# Patient Record
Sex: FEMALE | Race: OTHER | HISPANIC OR LATINO
[De-identification: names, ages, dates, MRNs, and addresses within clinical notes are randomized per-mention and may not be internally consistent; named-entity substitution may affect disease eponyms.]

---

## 2017-10-17 ENCOUNTER — APPOINTMENT (OUTPATIENT)
Dept: OBGYN | Facility: CLINIC | Age: 77
End: 2017-10-17
Payer: MEDICARE

## 2017-10-17 VITALS
SYSTOLIC BLOOD PRESSURE: 150 MMHG | DIASTOLIC BLOOD PRESSURE: 84 MMHG | WEIGHT: 180 LBS | HEIGHT: 67 IN | BODY MASS INDEX: 28.25 KG/M2

## 2017-10-17 DIAGNOSIS — Z00.00 ENCOUNTER FOR GENERAL ADULT MEDICAL EXAMINATION W/OUT ABNORMAL FINDINGS: ICD-10-CM

## 2017-10-17 PROCEDURE — G0101: CPT

## 2017-10-17 PROCEDURE — 82270 OCCULT BLOOD FECES: CPT

## 2017-10-23 LAB — CYTOLOGY CVX/VAG DOC THIN PREP: NORMAL

## 2017-11-05 ENCOUNTER — FORM ENCOUNTER (OUTPATIENT)
Age: 77
End: 2017-11-05

## 2017-11-06 ENCOUNTER — OUTPATIENT (OUTPATIENT)
Dept: OUTPATIENT SERVICES | Facility: HOSPITAL | Age: 77
LOS: 1 days | End: 2017-11-06
Payer: MEDICARE

## 2017-11-06 ENCOUNTER — APPOINTMENT (OUTPATIENT)
Dept: RADIOLOGY | Facility: CLINIC | Age: 77
End: 2017-11-06
Payer: MEDICARE

## 2017-11-06 DIAGNOSIS — Z00.8 ENCOUNTER FOR OTHER GENERAL EXAMINATION: ICD-10-CM

## 2017-11-06 DIAGNOSIS — Z90.710 ACQUIRED ABSENCE OF BOTH CERVIX AND UTERUS: Chronic | ICD-10-CM

## 2017-11-06 DIAGNOSIS — Z86.79 PERSONAL HISTORY OF OTHER DISEASES OF THE CIRCULATORY SYSTEM: Chronic | ICD-10-CM

## 2017-11-06 PROCEDURE — 77080 DXA BONE DENSITY AXIAL: CPT

## 2017-11-06 PROCEDURE — 77080 DXA BONE DENSITY AXIAL: CPT | Mod: 26

## 2017-12-04 ENCOUNTER — APPOINTMENT (OUTPATIENT)
Dept: OBGYN | Facility: CLINIC | Age: 77
End: 2017-12-04

## 2017-12-13 ENCOUNTER — APPOINTMENT (OUTPATIENT)
Dept: OBGYN | Facility: CLINIC | Age: 77
End: 2017-12-13
Payer: MEDICARE

## 2017-12-13 VITALS
WEIGHT: 180 LBS | SYSTOLIC BLOOD PRESSURE: 134 MMHG | HEIGHT: 67 IN | BODY MASS INDEX: 28.25 KG/M2 | DIASTOLIC BLOOD PRESSURE: 82 MMHG

## 2017-12-13 PROCEDURE — 99214 OFFICE O/P EST MOD 30 MIN: CPT

## 2017-12-13 RX ORDER — RISEDRONATE SODIUM 35 MG/1
35 TABLET, FILM COATED ORAL
Qty: 12 | Refills: 3 | Status: ACTIVE | COMMUNITY
Start: 2017-12-13 | End: 1900-01-01

## 2018-06-04 ENCOUNTER — APPOINTMENT (OUTPATIENT)
Dept: OBGYN | Facility: CLINIC | Age: 78
End: 2018-06-04
Payer: MEDICARE

## 2018-06-04 VITALS
DIASTOLIC BLOOD PRESSURE: 84 MMHG | HEART RATE: 97 BPM | BODY MASS INDEX: 27.78 KG/M2 | SYSTOLIC BLOOD PRESSURE: 140 MMHG | HEIGHT: 67 IN | WEIGHT: 177 LBS

## 2018-06-04 DIAGNOSIS — N76.4 ABSCESS OF VULVA: ICD-10-CM

## 2018-06-04 PROCEDURE — 99213 OFFICE O/P EST LOW 20 MIN: CPT

## 2018-06-04 RX ORDER — FLECAINIDE ACETATE 100 MG/1
100 TABLET ORAL
Qty: 180 | Refills: 0 | Status: ACTIVE | COMMUNITY
Start: 2018-05-21

## 2018-06-04 RX ORDER — AMOXICILLIN 500 MG/1
500 CAPSULE ORAL
Qty: 8 | Refills: 0 | Status: COMPLETED | COMMUNITY
Start: 2018-02-12

## 2018-06-04 RX ORDER — METOPROLOL SUCCINATE 50 MG/1
50 TABLET, EXTENDED RELEASE ORAL
Qty: 90 | Refills: 0 | Status: ACTIVE | COMMUNITY
Start: 2017-12-04

## 2018-06-04 RX ORDER — WARFARIN 2 MG/1
2 TABLET ORAL
Qty: 150 | Refills: 0 | Status: ACTIVE | COMMUNITY
Start: 2018-05-17

## 2018-06-04 RX ORDER — CEPHALEXIN 500 MG/1
500 CAPSULE ORAL
Qty: 10 | Refills: 0 | Status: ACTIVE | COMMUNITY
Start: 2018-06-04 | End: 1900-01-01

## 2019-01-28 ENCOUNTER — OUTPATIENT (OUTPATIENT)
Dept: OUTPATIENT SERVICES | Facility: HOSPITAL | Age: 79
LOS: 1 days | End: 2019-01-28
Payer: MEDICARE

## 2019-01-28 DIAGNOSIS — I69.354 HEMIPLEGIA AND HEMIPARESIS FOLLOWING CEREBRAL INFARCTION AFFECTING LEFT NON-DOMINANT SIDE: ICD-10-CM

## 2019-01-28 DIAGNOSIS — R27.9 UNSPECIFIED LACK OF COORDINATION: ICD-10-CM

## 2019-01-28 DIAGNOSIS — Z51.89 ENCOUNTER FOR OTHER SPECIFIED AFTERCARE: ICD-10-CM

## 2019-01-28 DIAGNOSIS — Z90.710 ACQUIRED ABSENCE OF BOTH CERVIX AND UTERUS: Chronic | ICD-10-CM

## 2019-01-28 DIAGNOSIS — Z86.79 PERSONAL HISTORY OF OTHER DISEASES OF THE CIRCULATORY SYSTEM: Chronic | ICD-10-CM

## 2019-05-30 PROCEDURE — 97112 NEUROMUSCULAR REEDUCATION: CPT | Mod: KX

## 2019-05-30 PROCEDURE — 97110 THERAPEUTIC EXERCISES: CPT | Mod: KX

## 2019-05-30 PROCEDURE — 97163 PT EVAL HIGH COMPLEX 45 MIN: CPT

## 2019-07-25 ENCOUNTER — APPOINTMENT (OUTPATIENT)
Dept: DERMATOLOGY | Facility: CLINIC | Age: 79
End: 2019-07-25
Payer: MEDICARE

## 2019-07-25 PROCEDURE — 99202 OFFICE O/P NEW SF 15 MIN: CPT

## 2020-09-16 DIAGNOSIS — Z12.39 ENCOUNTER FOR OTHER SCREENING FOR MALIGNANT NEOPLASM OF BREAST: ICD-10-CM

## 2020-09-16 DIAGNOSIS — Z78.0 ASYMPTOMATIC MENOPAUSAL STATE: ICD-10-CM

## 2020-09-17 ENCOUNTER — APPOINTMENT (OUTPATIENT)
Dept: OBGYN | Facility: CLINIC | Age: 80
End: 2020-09-17
Payer: MEDICARE

## 2020-09-17 VITALS
HEIGHT: 67 IN | WEIGHT: 183 LBS | SYSTOLIC BLOOD PRESSURE: 141 MMHG | DIASTOLIC BLOOD PRESSURE: 87 MMHG | BODY MASS INDEX: 28.72 KG/M2 | HEART RATE: 80 BPM

## 2020-09-17 DIAGNOSIS — M81.0 AGE-RELATED OSTEOPOROSIS W/OUT CURRENT PATHOLOGICAL FRACTURE: ICD-10-CM

## 2020-09-17 DIAGNOSIS — L25.9 UNSPECIFIED CONTACT DERMATITIS, UNSPECIFIED CAUSE: ICD-10-CM

## 2020-09-17 DIAGNOSIS — Z01.419 ENCOUNTER FOR GYNECOLOGICAL EXAMINATION (GENERAL) (ROUTINE) W/OUT ABNORMAL FINDINGS: ICD-10-CM

## 2020-09-17 PROCEDURE — 82270 OCCULT BLOOD FECES: CPT

## 2020-09-17 PROCEDURE — G0101: CPT

## 2020-09-17 NOTE — DISCUSSION/SUMMARY
[FreeTextEntry1] : Discussed with the pt the importance of exercise weight bearing,yoga, aerobics good variety, \par calcium totalling  mg between food and vitamins also vitamin D 2000iu daily, fish oil. \par ALso that any drop of blood after menapause is not good. \par FU in one year. \par  \par discussed colonoscopy and derma appt.\par

## 2020-09-17 NOTE — HISTORY OF PRESENT ILLNESS
[FreeTextEntry1] : 78 yo complaining about a rash to groin for 1 month. she used bacitracin and it burns she wears a pad for some leakage.  NO pmpbleeding . HX of hematuria sees urology and testing is negative no soap change has been on amoxil for dental work.  She changed wipes she uses. . New pads also\par  [No] : Patient does not have concerns regarding sex

## 2020-09-17 NOTE — PHYSICAL EXAM
[Appropriately responsive] : appropriately responsive [Alert] : alert [No Acute Distress] : no acute distress [No Lymphadenopathy] : no lymphadenopathy [No Murmurs] : no murmurs [Soft] : soft [Non-tender] : non-tender [Non-distended] : non-distended [No HSM] : No HSM [No Lesions] : no lesions [No Mass] : no mass [Oriented x3] : oriented x3 [Examination Of The Breasts] : a normal appearance [No Masses] : no breast masses were palpable [Labia Majora] : normal [Labia Minora] : normal [Normal] : normal [Absent] : absent [No Tenderness] : no tenderness [Nl Sphincter Tone] : normal sphincter tone [FreeTextEntry9] : -lamonte

## 2020-09-22 LAB — BACTERIA GENITAL AEROBE CULT: NORMAL

## 2021-03-06 ENCOUNTER — RX RENEWAL (OUTPATIENT)
Age: 81
End: 2021-03-06

## 2021-03-06 RX ORDER — CLOBETASOL PROPIONATE 0.5 MG/G
0.05 OINTMENT TOPICAL TWICE DAILY
Qty: 60 | Refills: 1 | Status: ACTIVE | COMMUNITY
Start: 2020-09-17 | End: 1900-01-01

## 2021-09-24 ENCOUNTER — EMERGENCY (EMERGENCY)
Facility: HOSPITAL | Age: 81
LOS: 1 days | End: 2021-09-24
Attending: EMERGENCY MEDICINE
Payer: MEDICARE

## 2021-09-24 VITALS
RESPIRATION RATE: 18 BRPM | HEART RATE: 60 BPM | SYSTOLIC BLOOD PRESSURE: 155 MMHG | DIASTOLIC BLOOD PRESSURE: 80 MMHG | HEIGHT: 66 IN | OXYGEN SATURATION: 98 % | TEMPERATURE: 98 F

## 2021-09-24 DIAGNOSIS — Z86.79 PERSONAL HISTORY OF OTHER DISEASES OF THE CIRCULATORY SYSTEM: Chronic | ICD-10-CM

## 2021-09-24 DIAGNOSIS — Z90.710 ACQUIRED ABSENCE OF BOTH CERVIX AND UTERUS: Chronic | ICD-10-CM

## 2021-09-24 PROCEDURE — 99283 EMERGENCY DEPT VISIT LOW MDM: CPT | Mod: 25

## 2021-09-24 PROCEDURE — 30901 CONTROL OF NOSEBLEED: CPT

## 2021-09-24 PROCEDURE — 30901 CONTROL OF NOSEBLEED: CPT | Mod: RT

## 2021-09-24 RX ADMIN — Medication 1 APPLICATION(S): at 03:55

## 2021-09-24 NOTE — ED ADULT TRIAGE NOTE - CHIEF COMPLAINT QUOTE
BIBEMS from home for a nosebleed that has been on and off for the last 3 days. Pt went to her doctor who told her to stop her Xarelto for 2 days, which she complied with. The nosebleed did not stop, in triage it is noted to be bright red and without clots. As per EMS there was significant blood in the home. Pt denies N/V, lightheadedness, dizziness, chest pain, SOB or other complaints. Denies trauma, endorses that this has happened to her before. States her only concern is the nosebleed.

## 2021-09-27 NOTE — ED PROVIDER NOTE - OBJECTIVE STATEMENT
BIBEMS from home for a nosebleed that has been on and off for the last 3 days. Pt went to her doctor who told her to stop her Xarelto for 2 days, which she complied with. The nosebleed did not stop, in triage it is noted to be bright red and without clots. As per EMS there was significant blood in the home. Pt denies N/V, lightheadedness, dizziness, chest pain, SOB or other complaints. Denies trauma, endorses that this has happened to her before. States her only concern is the nosebleed. no overt active bleeding she is back opn eliquis no chest pain or sob no abd pain

## 2021-09-27 NOTE — ED PROVIDER NOTE - NSICDXPASTMEDICALHX_GEN_ALL_CORE_FT
PAST MEDICAL HISTORY:  Atrial fibrillation     CVA (cerebral vascular accident)     Neuropathy     Osteoarthritis

## 2021-09-27 NOTE — ED PROVIDER NOTE - ENMT, MLM
Airway patent, + right sided kisselbach plexus mucosal bleeding  Mouth with normal mucosa. Throat has no vesicles, no oropharyngeal exudates and uvula is midline. no septal hematoma

## 2021-09-27 NOTE — ED PROVIDER NOTE - CLINICAL SUMMARY MEDICAL DECISION MAKING FREE TEXT BOX
bleeding controlled with cauterization silver nitrate normotensive return precautiosn givent to pt they agree to plan of care

## 2021-09-27 NOTE — ED PROVIDER NOTE - PATIENT PORTAL LINK FT
You can access the FollowMyHealth Patient Portal offered by [facility] by registering at the following website: [email or web address]. By joining [employer]’s FollowMyHealth portal, you will also be able to view your health information using other applications (apps) compatible with our system.

## 2021-09-27 NOTE — ED PROCEDURE NOTE - CPROC ED NASAL DETAIL1
silver nitrate/The source of the bleeding was clearly identified./The area was cauterized with silver nitrate.

## 2021-09-30 ENCOUNTER — EMERGENCY (EMERGENCY)
Facility: HOSPITAL | Age: 81
LOS: 1 days | Discharge: HOME | End: 2021-09-30
Attending: EMERGENCY MEDICINE
Payer: MEDICARE

## 2021-09-30 VITALS
WEIGHT: 164.91 LBS | SYSTOLIC BLOOD PRESSURE: 100 MMHG | HEART RATE: 60 BPM | RESPIRATION RATE: 22 BRPM | TEMPERATURE: 98 F | HEIGHT: 66 IN | OXYGEN SATURATION: 97 % | DIASTOLIC BLOOD PRESSURE: 63 MMHG

## 2021-09-30 DIAGNOSIS — Z98.890 OTHER SPECIFIED POSTPROCEDURAL STATES: Chronic | ICD-10-CM

## 2021-09-30 DIAGNOSIS — Z90.710 ACQUIRED ABSENCE OF BOTH CERVIX AND UTERUS: Chronic | ICD-10-CM

## 2021-09-30 DIAGNOSIS — Z86.79 PERSONAL HISTORY OF OTHER DISEASES OF THE CIRCULATORY SYSTEM: Chronic | ICD-10-CM

## 2021-09-30 LAB
ALBUMIN SERPL ELPH-MCNC: 3.8 G/DL — SIGNIFICANT CHANGE UP (ref 3.3–5.2)
ALP SERPL-CCNC: 98 U/L — SIGNIFICANT CHANGE UP (ref 40–120)
ALT FLD-CCNC: 16 U/L — SIGNIFICANT CHANGE UP
ANION GAP SERPL CALC-SCNC: 13 MMOL/L — SIGNIFICANT CHANGE UP (ref 5–17)
APPEARANCE UR: CLEAR — SIGNIFICANT CHANGE UP
AST SERPL-CCNC: 45 U/L — HIGH
BACTERIA # UR AUTO: ABNORMAL
BASOPHILS # BLD AUTO: 0.05 K/UL — SIGNIFICANT CHANGE UP (ref 0–0.2)
BASOPHILS NFR BLD AUTO: 0.6 % — SIGNIFICANT CHANGE UP (ref 0–2)
BILIRUB SERPL-MCNC: 0.4 MG/DL — SIGNIFICANT CHANGE UP (ref 0.4–2)
BILIRUB UR-MCNC: NEGATIVE — SIGNIFICANT CHANGE UP
BUN SERPL-MCNC: 19.4 MG/DL — SIGNIFICANT CHANGE UP (ref 8–20)
CALCIUM SERPL-MCNC: 9.2 MG/DL — SIGNIFICANT CHANGE UP (ref 8.6–10.2)
CHLORIDE SERPL-SCNC: 103 MMOL/L — SIGNIFICANT CHANGE UP (ref 98–107)
CO2 SERPL-SCNC: 22 MMOL/L — SIGNIFICANT CHANGE UP (ref 22–29)
COLOR SPEC: YELLOW — SIGNIFICANT CHANGE UP
CREAT SERPL-MCNC: 0.77 MG/DL — SIGNIFICANT CHANGE UP (ref 0.5–1.3)
DIFF PNL FLD: ABNORMAL
EOSINOPHIL # BLD AUTO: 0.08 K/UL — SIGNIFICANT CHANGE UP (ref 0–0.5)
EOSINOPHIL NFR BLD AUTO: 0.9 % — SIGNIFICANT CHANGE UP (ref 0–6)
EPI CELLS # UR: SIGNIFICANT CHANGE UP
GLUCOSE SERPL-MCNC: 89 MG/DL — SIGNIFICANT CHANGE UP (ref 70–99)
GLUCOSE UR QL: NEGATIVE MG/DL — SIGNIFICANT CHANGE UP
HCT VFR BLD CALC: 39.3 % — SIGNIFICANT CHANGE UP (ref 34.5–45)
HGB BLD-MCNC: 13 G/DL — SIGNIFICANT CHANGE UP (ref 11.5–15.5)
IMM GRANULOCYTES NFR BLD AUTO: 0.3 % — SIGNIFICANT CHANGE UP (ref 0–1.5)
KETONES UR-MCNC: NEGATIVE — SIGNIFICANT CHANGE UP
LEUKOCYTE ESTERASE UR-ACNC: NEGATIVE — SIGNIFICANT CHANGE UP
LYMPHOCYTES # BLD AUTO: 2.34 K/UL — SIGNIFICANT CHANGE UP (ref 1–3.3)
LYMPHOCYTES # BLD AUTO: 25.9 % — SIGNIFICANT CHANGE UP (ref 13–44)
MCHC RBC-ENTMCNC: 30.6 PG — SIGNIFICANT CHANGE UP (ref 27–34)
MCHC RBC-ENTMCNC: 33.1 GM/DL — SIGNIFICANT CHANGE UP (ref 32–36)
MCV RBC AUTO: 92.5 FL — SIGNIFICANT CHANGE UP (ref 80–100)
MONOCYTES # BLD AUTO: 0.74 K/UL — SIGNIFICANT CHANGE UP (ref 0–0.9)
MONOCYTES NFR BLD AUTO: 8.2 % — SIGNIFICANT CHANGE UP (ref 2–14)
NEUTROPHILS # BLD AUTO: 5.79 K/UL — SIGNIFICANT CHANGE UP (ref 1.8–7.4)
NEUTROPHILS NFR BLD AUTO: 64.1 % — SIGNIFICANT CHANGE UP (ref 43–77)
NITRITE UR-MCNC: NEGATIVE — SIGNIFICANT CHANGE UP
PH UR: 7 — SIGNIFICANT CHANGE UP (ref 5–8)
PLATELET # BLD AUTO: 340 K/UL — SIGNIFICANT CHANGE UP (ref 150–400)
POTASSIUM SERPL-MCNC: 5 MMOL/L — SIGNIFICANT CHANGE UP (ref 3.5–5.3)
POTASSIUM SERPL-SCNC: 5 MMOL/L — SIGNIFICANT CHANGE UP (ref 3.5–5.3)
PROT SERPL-MCNC: 7.1 G/DL — SIGNIFICANT CHANGE UP (ref 6.6–8.7)
PROT UR-MCNC: 15 MG/DL
RBC # BLD: 4.25 M/UL — SIGNIFICANT CHANGE UP (ref 3.8–5.2)
RBC # FLD: 12.7 % — SIGNIFICANT CHANGE UP (ref 10.3–14.5)
RBC CASTS # UR COMP ASSIST: SIGNIFICANT CHANGE UP /HPF (ref 0–4)
SODIUM SERPL-SCNC: 138 MMOL/L — SIGNIFICANT CHANGE UP (ref 135–145)
SP GR SPEC: 1 — LOW (ref 1.01–1.02)
TROPONIN T SERPL-MCNC: <0.01 NG/ML — SIGNIFICANT CHANGE UP (ref 0–0.06)
TROPONIN T SERPL-MCNC: <0.01 NG/ML — SIGNIFICANT CHANGE UP (ref 0–0.06)
UROBILINOGEN FLD QL: NEGATIVE MG/DL — SIGNIFICANT CHANGE UP
WBC # BLD: 9.03 K/UL — SIGNIFICANT CHANGE UP (ref 3.8–10.5)
WBC # FLD AUTO: 9.03 K/UL — SIGNIFICANT CHANGE UP (ref 3.8–10.5)
WBC UR QL: SIGNIFICANT CHANGE UP

## 2021-09-30 PROCEDURE — 70450 CT HEAD/BRAIN W/O DYE: CPT | Mod: 26,MA

## 2021-09-30 PROCEDURE — 99218: CPT

## 2021-09-30 PROCEDURE — 93306 TTE W/DOPPLER COMPLETE: CPT | Mod: 26

## 2021-09-30 PROCEDURE — 71045 X-RAY EXAM CHEST 1 VIEW: CPT | Mod: 26

## 2021-09-30 PROCEDURE — 93010 ELECTROCARDIOGRAM REPORT: CPT

## 2021-09-30 RX ORDER — SODIUM CHLORIDE 9 MG/ML
1000 INJECTION, SOLUTION INTRAVENOUS
Refills: 0 | Status: DISCONTINUED | OUTPATIENT
Start: 2021-09-30 | End: 2021-10-04

## 2021-09-30 RX ORDER — FLECAINIDE ACETATE 50 MG
100 TABLET ORAL
Refills: 0 | Status: DISCONTINUED | OUTPATIENT
Start: 2021-09-30 | End: 2021-10-04

## 2021-09-30 RX ORDER — METOPROLOL TARTRATE 50 MG
50 TABLET ORAL DAILY
Refills: 0 | Status: DISCONTINUED | OUTPATIENT
Start: 2021-09-30 | End: 2021-10-04

## 2021-09-30 RX ORDER — SODIUM CHLORIDE 9 MG/ML
1000 INJECTION, SOLUTION INTRAVENOUS ONCE
Refills: 0 | Status: COMPLETED | OUTPATIENT
Start: 2021-09-30 | End: 2021-09-30

## 2021-09-30 RX ORDER — RIVAROXABAN 15 MG-20MG
20 KIT ORAL
Refills: 0 | Status: DISCONTINUED | OUTPATIENT
Start: 2021-09-30 | End: 2021-10-04

## 2021-09-30 RX ADMIN — SODIUM CHLORIDE 500 MILLILITER(S): 9 INJECTION, SOLUTION INTRAVENOUS at 13:03

## 2021-09-30 RX ADMIN — SODIUM CHLORIDE 125 MILLILITER(S): 9 INJECTION, SOLUTION INTRAVENOUS at 17:26

## 2021-09-30 RX ADMIN — Medication 100 MILLIGRAM(S): at 17:50

## 2021-09-30 RX ADMIN — RIVAROXABAN 20 MILLIGRAM(S): KIT at 17:50

## 2021-09-30 NOTE — ED PROVIDER NOTE - NSICDXPASTSURGICALHX_GEN_ALL_CORE_FT
PAST SURGICAL HISTORY:  History of pacemaker     History of repair of hip fracture     S/P hysterectomy

## 2021-09-30 NOTE — ED ADULT TRIAGE NOTE - CHIEF COMPLAINT QUOTE
davy been feeling weak and tired all day, I was taking a shower when I began to feel extremely sweaty and dizzy. presents to ED A&Ox4 GCS 15

## 2021-09-30 NOTE — CONSULT NOTE ADULT - SUBJECTIVE AND OBJECTIVE BOX
[facility], THE HEART CENTER                                   [address] [place] [address]                                                      PHONE: [phone]                                                         FAX: [phone]  [email or web address]  ---------------------------------------------------------------------------------------------------------------------------------    Reason for Consult: Dizziness    HPI:  MARIAELENA RONDON is an 80y Female History of paroxysmal atrial fibrillation status post permanent pacemaker on chronic anticoagulation last week had evidence of epistaxis that requires cauterization by ENTPresented to the ER complaining of episode of weakness this morning , she has a past medical history of CVA in 2013 with residual left-sided weakness    PAST MEDICAL & SURGICAL HISTORY:  Osteoarthritis    Neuropathy    Atrial fibrillation    CVA (cerebral vascular accident)    S/P hysterectomy    History of pacemaker    History of repair of hip fracture        epinephrine (Unknown)  filbert nuts (Unknown)  grass (Unknown)  Levaquin (Unknown)  Macrobid (Unknown)  pollen (Unknown)  sulfamethoxazole (Unknown)      MEDICATIONS  (STANDING):    MEDICATIONS  (PRN):      Social History:  Cigarettes:                    Alchohol:                 Illicit Drug Abuse:      REVIEW OF SYSTEMS:    Constitutional: No fever, weight loss or fatigue  Eyes: No eye pain, visual disturbances, or discharge  ENMT:  No difficulty hearing, tinnitus, vertigo; No sinus or throat pain  Neck: No pain or stiffness  Respiratory: No cough, wheezing, chills or hemoptysis  Cardiovascular: No chest pain, palpitations, shortness of breath, dizziness or leg swelling  Gastrointestinal: No abdominal or epigastric pain. No nausea, vomiting or hematemesis; No diarrhea or constipation. No melena or hematochezia.  Genitourinary: No dysuria, frequency, hematuria or incontinence  Rectal: No pain, hemorrhoids or incontinence  Neurological: No headaches, memory loss, loss of strength, numbness or tremors  Skin: No itching, burning, rashes or lesions   Lymph Nodes: No enlarged glands  Endocrine: No heat or cold intolerance; No hair loss  Musculoskeletal: No joint pain or swelling; No muscle, back or extremity pain  Psychiatric: No depression, anxiety, mood swings or difficulty sleeping  Heme/Lymph: No easy bruising or bleeding gums  Allergy and Immunologic: No hives or eczema    Vital Signs Last 24 Hrs  T(C): 36.8 (30 Sep 2021 11:57), Max: 36.8 (30 Sep 2021 11:57)  T(F): 98.3 (30 Sep 2021 11:57), Max: 98.3 (30 Sep 2021 11:57)  HR: 60 (30 Sep 2021 11:57) (60 - 60)  BP: 100/63 (30 Sep 2021 11:57) (100/63 - 100/63)  BP(mean): --  RR: 22 (30 Sep 2021 11:57) (22 - 22)  SpO2: 97% (30 Sep 2021 11:57) (97% - 97%)  ICU Vital Signs Last 24 Hrs  MICHAELAALEX ALCON  I&O's Detail    I&O's Summary    Drug Dosing Weight  MARIAELENA RONDON      PHYSICAL EXAM:  General: Appears well developed, well nourished alert and cooperative.  HEENT: Head; normocephalic, atraumatic.  Eyes: Pupils reactive, cornea wnl.  Neck: Supple, no nodes adenopathy, no NVD or carotid bruit or thyromegaly.  CARDIOVASCULAR: Normal S1 and S2, No murmur, rub, gallop or lift.   LUNGS: No rales, rhonchi or wheeze. Normal breath sounds bilaterally.  ABDOMEN: Soft, nontender without mass or organomegaly. bowel sounds normoactive.  EXTREMITIES: No clubbing, cyanosis or edema. Distal pulses wnl.   SKIN: warm and dry with normal turgor.  NEURO: Alert/oriented x 3/normal motor exam. No pathologic reflexes.    PSYCH: normal affect.        LABS:                        13.0   9.03  )-----------( 340      ( 30 Sep 2021 12:54 )             39.3     09-30    138  |  103  |  19.4  ----------------------------<  89  5.0   |  22.0  |  0.77    Ca    9.2      30 Sep 2021 12:54    TPro  7.1  /  Alb  3.8  /  TBili  0.4  /  DBili  x   /  AST  45<H>  /  ALT  16  /  AlkPhos  98  09-30    MARIAELENA RONDON  CARDIAC MARKERS ( 30 Sep 2021 12:54 )  x     / <0.01 ng/mL / x     / x     / x                RADIOLOGY & ADDITIONAL STUDIES:    INTERPRETATION OF TELEMETRY (personally reviewed):    ECG:    ECHO: < from: TTE Echo Complete w/Doppler (02.16.15 @ 07:37) >  Left Ventricle: The left ventricular internal cavity size is normal. Left   ventricular wall thickness is normal.  Global LV systolic function was normal. Left ventricular ejection   fraction, by visual estimation, is 55 to 60%. Abnormal (paradoxical)   septal motion, consistent withRV pacemaker.  Right Ventricle: The right ventricular size is normal. RV systolic   function is normal.  Left Atrium: Mild to moderately enlarged left atrium.  Right Atrium: The right atrium is normal in size.  Pericardium: There is no evidence of pericardial effusion.  Mitral Valve: The mitral valve is normal in structure. No evidence of   mitral valve regurgitation is seen.  Tricuspid Valve: The tricuspid valve is not well seen. Mild tricuspid   regurgitation is visualized.  Aortic Valve: The aortic valve is trileaflet. No evidence of aortic   stenosis.  Pulmonic Valve: The pulmonic valve was not well visualized.  Aorta: The aorta was not well visualized.  Pulmonary Artery: The pulmonary artery is not well seen.  Venous: The pulmonary veinswere not well visualized. The inferior vena   cava was normal sized, with respiratory size variation greater than 50%.  Additional Comments: A pacer wire is visualized in the right atrium and   right ventricle.     IMPRESSION:  Summary:   1. Mild to moderately enlarged left atrium.   2. Left ventricular ejection fraction, by visual estimation, is 55 to   60%.   3. There is no evidence of pericardial effusion.    < end of copied text >        ACTIVE PROBLEMS:  HEALTH ISSUES - PROBLEM Dx:          Assessment and Plan:    In summary, MARIAELENA RONDON is an 80y Female History of paroxysmal atrial fibrillation status post permanent pacemaker on chronic anticoagulation last week had evidence of epistaxis that requires cauterization by ENTPresented to the ER complaining of episode of weakness this morning , she has a past medical history of CVA in 2013 with residual left-sided weakness    Telemetry monitoring  Serial cardiac markers and EKgs  2DEchocardiogram  Continue present cardiac therapy    LELIA BRITT MD, FACC, [facility]                 [facility], THE [facility]ART [facility]                                   [address] [place] [address]                                                      PHONE: [phone]                                                         FAX: [phone]  [email or web address]  ---------------------------------------------------------------------------------------------------------------------------------    Reason for Consult: Dizziness    HPI:  MARIAELENA RONDON is an 80y Female History of paroxysmal atrial fibrillation status post permanent pacemaker on chronic anticoagulation last week had evidence of epistaxis that requires cauterization by ENTPresented to the ER complaining of episode of weakness fatigue and malaise since this morning , she has a past medical history of CVA in 2013 with residual left-sided weakness    PAST MEDICAL & SURGICAL HISTORY:  Osteoarthritis    Neuropathy    Atrial fibrillation    CVA (cerebral vascular accident)    S/P hysterectomy    History of pacemaker    History of repair of hip fracture        epinephrine (Unknown)  filbert nuts (Unknown)  grass (Unknown)  Levaquin (Unknown)  Macrobid (Unknown)  pollen (Unknown)  sulfamethoxazole (Unknown)      MEDICATIONS  (STANDING):    MEDICATIONS  (PRN):      Social History:  Cigarettes:                    Alchohol:                 Illicit Drug Abuse:      REVIEW OF SYSTEMS:    Constitutional: No fever, weight loss or fatigue  Eyes: No eye pain, visual disturbances, or discharge  ENMT:  No difficulty hearing, tinnitus, vertigo; No sinus or throat pain  Neck: No pain or stiffness  Respiratory: No cough, wheezing, chills or hemoptysis  Cardiovascular: No chest pain, palpitations, shortness of breath, dizziness or leg swelling  Gastrointestinal: No abdominal or epigastric pain. No nausea, vomiting or hematemesis; No diarrhea or constipation. No melena or hematochezia.  Genitourinary: No dysuria, frequency, hematuria or incontinence  Rectal: No pain, hemorrhoids or incontinence  Neurological: No headaches, memory loss, loss of strength, numbness or tremors  Skin: No itching, burning, rashes or lesions   Lymph Nodes: No enlarged glands  Endocrine: No heat or cold intolerance; No hair loss  Musculoskeletal: No joint pain or swelling; No muscle, back or extremity pain  Psychiatric: No depression, anxiety, mood swings or difficulty sleeping  Heme/Lymph: No easy bruising or bleeding gums  Allergy and Immunologic: No hives or eczema    Vital Signs Last 24 Hrs  T(C): 36.8 (30 Sep 2021 11:57), Max: 36.8 (30 Sep 2021 11:57)  T(F): 98.3 (30 Sep 2021 11:57), Max: 98.3 (30 Sep 2021 11:57)  HR: 60 (30 Sep 2021 11:57) (60 - 60)  BP: 100/63 (30 Sep 2021 11:57) (100/63 - 100/63)  BP(mean): --  RR: 22 (30 Sep 2021 11:57) (22 - 22)  SpO2: 97% (30 Sep 2021 11:57) (97% - 97%)  ICU Vital Signs Last 24 Hrs  [facility] CenterKRISTEL [facility]IGNESHAt[facility]  I&O's Detail    I&O's Summary    Drug Dosing Weight  MARIAELENA RONDON      PHYSICAL EXAM:  General: Appears well developed, well nourished alert and cooperative.  HEENT: Head; normocephalic, atraumatic.  Eyes: Pupils reactive, cornea wnl.  Neck: Supple, no nodes adenopathy, no NVD or carotid bruit or thyromegaly.  CARDIOVASCULAR: Normal S1 and S2, No murmur, rub, gallop or lift.   LUNGS: No rales, rhonchi or wheeze. Normal breath sounds bilaterally.  ABDOMEN: Soft, nontender without mass or organomegaly. bowel sounds normoactive.  EXTREMITIES: No clubbing, cyanosis or edema. Distal pulses wnl.   SKIN: warm and dry with normal turgor.  NEURO: Alert/oriented x 3/normal motor exam. No pathologic reflexes.    PSYCH: normal affect.        LABS:                        13.0   9.03  )-----------( 340      ( 30 Sep 2021 12:54 )             39.3     09-30    138  |  103  |  19.4  ----------------------------<  89  5.0   |  22.0  |  0.77    Ca    9.2      30 Sep 2021 12:54    TPro  7.1  /  Alb  3.8  /  TBili  0.4  /  DBili  x   /  AST  45<H>  /  ALT  16  /  AlkPhos  98  09-30    MARIAELENA RONDON  CARDIAC MARKERS ( 30 Sep 2021 12:54 )  x     / <0.01 ng/mL / x     / x     / x                RADIOLOGY & ADDITIONAL STUDIES:    INTERPRETATION OF TELEMETRY (personally reviewed):    ECG: P , TELE A PACING    ECHO: < from: TTE Echo Complete w/Doppler (02.16.15 @ 07:37) >  Left Ventricle: The left ventricular internal cavity size is normal. Left   ventricular wall thickness is normal.  Global LV systolic function was normal. Left ventricular ejection   fraction, by visual estimation, is 55 to 60%. Abnormal (paradoxical)   septal motion, consistent withRV pacemaker.  Right Ventricle: The right ventricular size is normal. RV systolic   function is normal.  Left Atrium: Mild to moderately enlarged left atrium.  Right Atrium: The right atrium is normal in size.  Pericardium: There is no evidence of pericardial effusion.  Mitral Valve: The mitral valve is normal in structure. No evidence of   mitral valve regurgitation is seen.  Tricuspid Valve: The tricuspid valve is not well seen. Mild tricuspid   regurgitation is visualized.  Aortic Valve: The aortic valve is trileaflet. No evidence of aortic   stenosis.  Pulmonic Valve: The pulmonic valve was not well visualized.  Aorta: The aorta was not well visualized.  Pulmonary Artery: The pulmonary artery is not well seen.  Venous: The pulmonary veinswere not well visualized. The inferior vena   cava was normal sized, with respiratory size variation greater than 50%.  Additional Comments: A pacer wire is visualized in the right atrium and   right ventricle.     IMPRESSION:  Summary:   1. Mild to moderately enlarged left atrium.   2. Left ventricular ejection fraction, by visual estimation, is 55 to   60%.   3. There is no evidence of pericardial effusion.    < end of copied text >        ACTIVE PROBLEMS:  HEALTH ISSUES - PROBLEM Dx:          Assessment and Plan:      MARIAELENA RONDON is an 80y Female History of paroxysmal atrial fibrillation status post permanent pacemaker on chronic anticoagulation last week had evidence of epistaxis that requires cauterization by ENT presented to the ER complaining of an episode of weakness fatigue and malaise since this morning , she has a past medical history of CVA in 2013 with residual left-sided weakness    CDU overnight  IVF hydration   Serial cardiac markers and EKgs  R/O  infection UTI- Sepsis  Nl Pacer function in tele   Will follow    LELIA BRITT MD, FACC, [facility]

## 2021-09-30 NOTE — ED CDU PROVIDER INITIAL DAY NOTE - MEDICAL DECISION MAKING DETAILS
PT with near syncope placed in obs for Diagnostic uncertainty. PT seen BY OBS PA found to be appropriate CDU PT care transferred to PA.

## 2021-09-30 NOTE — ED ADULT NURSE REASSESSMENT NOTE - NIH STROKE SCALE: 4. FACIAL PALSY, QM
Subjective:          Chief Complaint: Pantera Martin is a 18 y.o. male who had concerns including Follow-up of the Right Knee.    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 He is here today for MRI results review of right knee. The pain started ~2 weeks ago and is becoming progressively worse after wrestling. No real clear MAGNOLIA. Patient wasn't able to put weight on leg after injury occurred. He reports that the pain is getting better after conservative treatment with [employer]. Pain Is located along the medial aspect of the knee. He is WBAT with crutches and drug store compression brace over knee.    Reports a long history, 2+ years of right knee pain that comes and goes and is located around the patella. Pain is worse with flexion of knee and squats.    No previous surgeries on knees  Mechanical symptoms: none  Subjective instability: (--)                 Review of Systems   Constitution: Negative for chills and fever.   HENT: Negative for congestion and sore throat.    Eyes: Negative for discharge and double vision.   Cardiovascular: Negative for chest pain, palpitations and syncope.   Respiratory: Negative for cough and shortness of breath.    Endocrine: Negative for cold intolerance and heat intolerance.   Skin: Negative for dry skin and rash.   Musculoskeletal: Positive for joint pain.   Gastrointestinal: Negative for abdominal pain, nausea and vomiting.   Neurological: Negative for focal weakness, numbness and paresthesias.       Pain Related Questions  Over the past 3 days, what was your average pain during activity? (I.e. running, jogging, walking, climbing stairs, getting dressed, ect.): 8  Over the past 3 days, what was your highest pain level?: 7  Over the past 3 days, what was your lowest pain level? : 2    Other  How many nights a week are you awakened by your affected body part?: 0  Was the patient's HEIGHT measured or patient reported?: Patient Reported  Was the patient's WEIGHT measured or  patient reported?: Measured      Objective:        General: Pantera MORALES is well-developed, well-nourished, appears stated age, in no acute distress, alert and oriented to time, place and person.     General    Vitals reviewed.  Constitutional: He is oriented to person, place, and time. He appears well-developed and well-nourished. No distress.   Eyes: Conjunctivae and EOM are normal. Pupils are equal, round, and reactive to light.   Neck: Normal range of motion. Neck supple. No JVD present.   Cardiovascular: Normal heart sounds and intact distal pulses.    No murmur heard.  Pulmonary/Chest: Effort normal and breath sounds normal. No respiratory distress.   Abdominal: Soft. Bowel sounds are normal. He exhibits no distension. There is no tenderness.   Neurological: He is alert and oriented to person, place, and time. Coordination normal.   Psychiatric: He has a normal mood and affect. His behavior is normal. Judgment and thought content normal.     General Musculoskeletal Exam   Gait: normal       Right Knee Exam     Inspection   Erythema: absent  Scars: absent  Swelling: absent  Effusion: effusion  Deformity: deformity  Bruising: absent    Tenderness   The patient is tender to palpation of the medial joint line, MCL and patella (thaddeus-patellar).    Range of Motion   Extension: 0   Right knee flexion: 135.     Tests   Meniscus   Alrene:  Medial - negative Lateral - negative  Ligament Examination   Lachman: abnormal - grade IPCL-Posterior Drawer: normal (0 to 2mm)     MCL - Valgus: abnormal - grade I  LCL - Varus: normalPivot Shift: normal (Equal)Reverse Pivot Shift: normal (Equal)Dial Test at 30 degrees: normal (< 5 degrees)Dial Test at 90 degrees: normal (< 5 degrees)  Patella   Patellar Glide (quadrants): Lateral - 2   Medial - 2  Patellar Grind: positive    Other   Sensation: normal    Left Knee Exam     Inspection   Erythema: absent  Scars: absent  Swelling: absent  Effusion: absent  Deformity: deformity  Bruising:  absent    Tenderness   The patient is experiencing no tenderness.         Range of Motion   Extension: 0   Flexion:  140 normal     Tests   Meniscus   Arlene:  Medial - negative Lateral - negative  Stability Lachman: normal (-1 to 2mm) PCL-Posterior Drawer: normal (0 to 2mm)  MCL - Valgus: normal (0 to 2mm)  LCL - Varus: normal (0 to 2mm)Pivot Shift: normal (Equal)Reverse Pivot Shift: normal (Equal)Dial Test at 30 degrees: normal (< 5 degrees)Dial Test at 90 degrees: normal (< 5 degrees)  Patella   Patellar Glide (Quadrants): Lateral - 2 Medial - 2  Patellar Grind: negative    Other   Sensation: normal    Right Hip Exam     Tests   Xiomara: negative  Left Hip Exam     Tests   Xiomara: negative          Muscle Strength   Right Lower Extremity   Hip Abduction: 5/5   Quadriceps:  4/5   Hamstrin/5   Left Lower Extremity   Hip Abduction: 5/5   Quadriceps:  5/5   Hamstrin/5     Vascular Exam     Right Pulses  Dorsalis Pedis:      2+  Posterior Tibial:      2+        Left Pulses  Dorsalis Pedis:      2+  Posterior Tibial:      2+        Edema  Right Lower Leg: absent  Left Lower Leg: absent    Radiographic Findings 10/27/2017:    4 views bilateral.    Right: No fracture dislocation bone destruction or OCD seen.    Left: No fracture dislocation bone destruction or OCD seen.    Xrays of the knees were ordered and reviewed by me today. These findings were discussed and reviewed with the patient.    MRI of right knee:  Findings:     Menisci:  There is no tear of the medial or lateral meniscus.     Ligaments:  ACL, PCL, and LCL complex are intact.  Mild edema is present near the proximal MCL consistent with grade 1 sprain.    Tendons:  Extensor mechanism is maintained.    Cartilage:   Patellofemoral: There is full thickness cartilage irregularity and signal abnormality involving the superior lateral patellar facet with significant underlying subchondral edema and possible early cystic changes.  Edema is noted within the  superior lateral Hoffa's fat pad.  Medial tibiofemoral: Articular cartilage is maintained.  Lateral tibiofemoral: Articular cartilage is maintained.    Bone: No fracture or marrow replacing process.    Miscellaneous: There is no significant joint effusion.        Assessment:       Encounter Diagnoses   Name Primary?    Internal derangement of right knee Yes    Chondromalacia patellae of right knee     Sprain of medial collateral ligament of right knee, subsequent encounter           Plan:         1. I made the decision to obtain old records of the patient including previous notes and imaging.  I independently reviewed and interpreted the radiographs and/or MRIs today as well as prior imaging. Reviewed MRI results with patient and patient's grandmother in great detail. Discussed possible treatment options including, HEP, formal PT, bracing, and discussion with Dr. Moyer.    [id number] - Ricky, performed a custom orthotic / brace adjustment, fitting and training with the patient. The patient demonstrated understanding and proper care. This was performed for 15 minutes. Placed in 12 inch hinged knee brace  WBAT with hinged knee brace    3. Hold out of wrestling and sports at this time    4. Patellofemoral HEP with ETHAN Baker    5. RTC in 3 days with Dr. Moyer to review MRI results and discuss cartilage injury significance    6. NSAIDS prn pain    All of the patient's questions were answered and the patient will contact us if they have any questions or concerns in the interim.                    Patient questionnaires may have been collected.   (0) Normal symmetrical movements

## 2021-09-30 NOTE — ED PROVIDER NOTE - ATTENDING CONTRIBUTION TO CARE
I, Jassi Jenkins, performed a face to face bedside interview with this patient regarding history of present illness, review of symptoms and relevant past medical, social and family history.  I completed an independent physical examination. I have communicated the patient’s plan of care and disposition with the student.  80 year old female with PMh CVA, HTN, afib on warfarin presents with near syncopal episode. pt states that she was walking in her bedroom when she felt acutely lightheaded, hot, diaphoretic and as if she was going to pass out. She did not lose consciousness and had no chest pain. Currently chest pain free  Gen: NAD, well appearing  CV: RRR  Pul: CTA b/l  Abd: Soft, non-distended, non-tender  Neuro: no focal deficits  AICD interrogated, pt asymptomatic, place don obs for syncope

## 2021-09-30 NOTE — ED ADULT NURSE NOTE - OBJECTIVE STATEMENT
Pt A&Ox4, NAD. Pt presenting to the ED with complaints of dizziness. Pt sts that after she got out of the shower this morning she then got dizzy. Pt with left side deficit from prior stroke in 2013. Pt sts she walks with a cane. Breathing even and unlabored.

## 2021-09-30 NOTE — ED CDU PROVIDER INITIAL DAY NOTE - DETAILS
Pt with near syncope episodes wo dizziness HA, change in vision, made worse with activity placed in obs for evaluation of Card cause of symptoms.

## 2021-09-30 NOTE — ED PROVIDER NOTE - OBJECTIVE STATEMENT
80/F presenting today with complaints of diaphoresis, lightheadedness and fatigue this AM. Pt states was getting ready to go to the dentist was standing up and felt lightheaded and diaphoretic pt sat down and symptoms improved pt denies any falls or trauma. Pt took amoxicillin this morning prior to going to the dentist for a cleaning at 10am but states has taken this medication before without any side effects. Pt with a history of A-fib which she takes Metoprolol and Xarelto daily, CVA in 2013 with residual left sided weakness, [employer] pacemaker placed in 2009 and a hip replacement in 2015. Pt denies any chest pain, SOB, edema or fevers but stated she has been feeling fatigued for the past week with minimal exertion at home and has been feeling congested due to seasonal allergies. Pt was in the ER last week for epistaxis which she had cauterized with resolution of bleeding. Pt BP today is 100/63, HR- 60 pt states normal SBP in the 130's. Pt denies any alcohol or tobacco usage at home.

## 2021-09-30 NOTE — ED CDU PROVIDER INITIAL DAY NOTE - ATTENDING CONTRIBUTION TO CARE
I agree with the PA's note and was available for any issues/concerns. I was directly involved in patient care. My brief overall assessment is as follows:    80 year old female PMHx atrial fibrillation, CVA c/o syncope; initial work up and cardiology assessment noted; admit to obs for further work up.

## 2021-10-01 VITALS
DIASTOLIC BLOOD PRESSURE: 72 MMHG | HEART RATE: 64 BPM | RESPIRATION RATE: 20 BRPM | SYSTOLIC BLOOD PRESSURE: 128 MMHG | OXYGEN SATURATION: 96 % | TEMPERATURE: 98 F

## 2021-10-01 LAB — TROPONIN T SERPL-MCNC: <0.01 NG/ML — SIGNIFICANT CHANGE UP (ref 0–0.06)

## 2021-10-01 PROCEDURE — 81001 URINALYSIS AUTO W/SCOPE: CPT

## 2021-10-01 PROCEDURE — 36415 COLL VENOUS BLD VENIPUNCTURE: CPT

## 2021-10-01 PROCEDURE — 93005 ELECTROCARDIOGRAM TRACING: CPT

## 2021-10-01 PROCEDURE — G0378: CPT

## 2021-10-01 PROCEDURE — 80053 COMPREHEN METABOLIC PANEL: CPT

## 2021-10-01 PROCEDURE — 93306 TTE W/DOPPLER COMPLETE: CPT

## 2021-10-01 PROCEDURE — 99217: CPT

## 2021-10-01 PROCEDURE — 85025 COMPLETE CBC W/AUTO DIFF WBC: CPT

## 2021-10-01 PROCEDURE — 70450 CT HEAD/BRAIN W/O DYE: CPT | Mod: MA

## 2021-10-01 PROCEDURE — 99284 EMERGENCY DEPT VISIT MOD MDM: CPT | Mod: 25

## 2021-10-01 PROCEDURE — 84484 ASSAY OF TROPONIN QUANT: CPT

## 2021-10-01 PROCEDURE — 71045 X-RAY EXAM CHEST 1 VIEW: CPT

## 2021-10-01 RX ORDER — METOPROLOL TARTRATE 50 MG
1 TABLET ORAL
Qty: 30 | Refills: 0
Start: 2021-10-01 | End: 2021-10-30

## 2021-10-01 RX ORDER — FLECAINIDE ACETATE 50 MG
1 TABLET ORAL
Qty: 60 | Refills: 0
Start: 2021-10-01 | End: 2021-10-30

## 2021-10-01 RX ADMIN — Medication 50 MILLIGRAM(S): at 05:58

## 2021-10-01 RX ADMIN — Medication 100 MILLIGRAM(S): at 05:57

## 2021-10-01 NOTE — ED CDU PROVIDER DISPOSITION NOTE - CARE PROVIDER_API CALL
Forrest Stevenson (MD; MPH)  Cardiology; Internal Medicine  [address] [place] [address]  Phone: [phone]  Fax: [phone]  Follow Up Time:

## 2021-10-01 NOTE — ED CDU PROVIDER SUBSEQUENT DAY NOTE - CONSTITUTIONAL [-], MLM
History of Present Illness





- Stated complaint


Stated Complaint: EAR PAIN





- Chief complaint


Chief Complaint: Heent





- Additonal information


Additional information: 





hx from pt


healthy 32 f 


[facility] staff 


denies preg


several days of nasal and sinus congestion, sneezing, PMD, sore throat and ear 

pain R > L


dizzy 2/2 ear congestion


drainage from R ear








Review of Systems


Constitutional: denies: Fever


Ears: reports: Ear pain


Nose: reports: Congestion, Sinus pressure / pain, Other (sneezing)


Throat: reports: Sore throat


: denies: Now pregnant EGA


Immunocompromised: denies: Immunocompromised





PD PAST MEDICAL HISTORY





- Past Medical History


Musculoskeletal: Fibromyalgia





- Past Surgical History


Past Surgical History: No





- Present Medications


Home Medications: 


 Ambulatory Orders











 Medication  Instructions  Recorded  Confirmed


 


Azithromycin [Zithromax] 250 mg PO DAILY #6 tablet 05/04/18 


 


Pseudoephedrine [Sudafed] 30 mg PO Q6H PRN #20 tablet 05/04/18 














- Allergies


Allergies/Adverse Reactions: 


 Allergies











Allergy/AdvReac Type Severity Reaction Status Date / Time


 


amoxicillin Allergy  Hives Verified 05/04/18 08:26


 


coconut oil Allergy  Hives Verified 05/04/18 08:26














- Social History


Does the pt smoke?: No


Smoking Status: Never smoker


Does the pt drink ETOH?: Yes


Does the pt have substance abuse?: No





- Immunizations


Immunizations are current?: Yes





PD ED PE NORMAL





- Vitals


Vital signs reviewed: Yes





- HEENT


HEENT: PERRL.  No: Ears normal (R TM dull with purulent fluid behind TM, no 

perf seen, no AOE, L TM nl, pharyn s swelling or exudate, mild erythema, PND 

with cobllestoning, no focal sinus swelling)





- Neck


Neck: Supple, no meningeal sign





- Cardiac


Cardiac: RRR





- Respiratory


Respiratory: No respiratory distress, Clear bilaterally





Results





- Vitals


Vitals: 





 Vital Signs - 24 hr











  05/04/18





  08:24


 


Temperature 36.5 C


 


Heart Rate 61


 


Respiratory 16





Rate 


 


Blood Pressure 125/84 H


 


O2 Saturation 100








 Oxygen











O2 Source                      Room air

















Departure





- Departure


Disposition: 01 Home, Self Care


Clinical Impression: 


Otitis media


Qualifiers:


 Otitis media type: suppurative Chronicity: acute Laterality: right Recurrence: 

not specified as recurrent Spontaneous tympanic membrane rupture: without 

spontaneous rupture Qualified Code(s): H66.001 - Acute suppurative otitis media 

without spontaneous rupture of ear drum, right ear





Condition: Good


Instructions:  ED Otitis Media Acute Adult


Follow-Up: 


Thomas Siu MD [Primary Care Provider] - 


Prescriptions: 


Azithromycin [Zithromax] 250 mg PO DAILY #6 tablet


Pseudoephedrine [Sudafed] 30 mg PO Q6H PRN #20 tablet


 PRN Reason: congestion


Comments: 


Take the antibiotic as prescribed


Take the sudafed as needed for congestion and ear pressure


A sinus irrigation system like the NettyPot might help as well.


Motrin and/or tylenol as needed for pain


Can also try a non sedating antihistamine such as zyrtec for the congestion and 

sneezing


I do not see a perforation of your ear drum, but because you have drainage from 

that ear there may be a small perforation and I recommend you take care to 

avoid getting water in that ear for two weeks


Forms:  Activity restrictions no fever/no weight loss/no chills

## 2021-10-01 NOTE — ED CDU PROVIDER DISPOSITION NOTE - NSFOLLOWUPINSTRUCTIONS_ED_ALL_ED_FT
Follow up with [facility] within 1-2 weeks  Continue Xaralto   Decrease Metoprolol to 25mg once a day   Decrease Flecainide to 50mg twice a day   Follow up with primary medical doctor in 2-3 days    Dizziness    Dizziness can manifest as a feeling of unsteadiness or light-headedness. You may feel like you are about to faint. This condition can be caused by a number of things, including medicines, dehydration, or illness. Drink enough fluid to keep your urine clear or pale yellow. Do not drink alcohol and limit your caffeine intake. Avoid quick or sudden movements.  Rise slowly from chairs and steady yourself until you feel okay. In the morning, first sit up on the side of the bed.    SEEK IMMEDIATE MEDICAL CARE IF YOU HAVE ANY OF THE FOLLOWING SYMPTOMS: vomiting, changes in your vision or speech, weakness in your arms or legs, trouble speaking or swallowing, chest pain, abdominal pain, shortness of breath, sweating, bleeding, headache, neck pain, or fever.

## 2021-10-01 NOTE — ED ADULT NURSE REASSESSMENT NOTE - NS ED NURSE REASSESS COMMENT FT1
pt to go home, discharge instructions given to patient by SUSANA Lee, also is calling family for 
Patient received awake and alert x4 in cart, HR reads paced at 60, pure wick in place draining clear yellow urine, patient has LR infusing into patent iv site, patient repositioned in bed, is in no acute distress, has no labored breathing.
assumed care of patient from PF RN, awaiting TTE results and cardiology  pt comfortable, denies any complaints,
Received patient from melissa RN.  Pt AxO4, VSS.  Pt denies chest pain/SOB at this time.  Cardio NSR on cardiac monitor.  Rt wrist  IV insertion site intact with no sign of infiltration noted. , flushing without difficulty. Assisted as needed to the bathroom with  minimal weakness noted on the left side due to an old stroke stated by pt. Instructed to PT to call for assistance as needed verbalized understandibg. Support provided,     Safety measures taken, bed in low position, call bell within reach, side rails up x2.  Plan of care explained.  Pt verbalized understanding.  Will continue to monitor.

## 2021-10-01 NOTE — ED CDU PROVIDER DISPOSITION NOTE - ATTENDING CONTRIBUTION TO CARE
Pt to ED for lightheadedness, and fatigue which started yesterday after taking meds. Denies CP, SOB, syncope. Currently, all symptoms have resolved. Pt placed in obs for cards consult and continued burns.  Pt tbd for outp fu with reduction of cardio-active meds

## 2021-10-01 NOTE — PROGRESS NOTE ADULT - SUBJECTIVE AND OBJECTIVE BOX
[facility]                              [address] [address]                                                 PHONE: [phone]                                                 FAX: [phone]  -----------------------------------------------------------------------------------------------  Pt seen and examined. FU for dizziness    Overnight events/Complaints: Pt without complains. Feels much better.    Vital Signs Last 24 Hrs  T(C): 36.6 (01 Oct 2021 05:01), Max: 36.8 (30 Sep 2021 11:57)  T(F): 97.9 (01 Oct 2021 05:01), Max: 98.3 (30 Sep 2021 11:57)  HR: 63 (01 Oct 2021 05:01) (60 - 70)  BP: 132/74 (01 Oct 2021 05:01) (100/63 - 142/76)  BP(mean): --  RR: 20 (01 Oct 2021 05:01) (17 - 22)  SpO2: 95% (01 Oct 2021 05:01) (95% - 98%)  I&O's Summary      PHYSICAL EXAM:  Constitutional: Appears well; alert and co-operative  HEENT:     Head: Normocephalic and atraumatic  Neck: supple. No JVD  Cardiovascular: regular S1 S2  Respiratory: Lungs clear to auscultation; no crepitations, no wheeze  Gastrointestinal:  Soft, Non-tender, + BS	  Musculoskeletal: Normal range of motion. No edema  Skin: Warm and dry. No cyanosis . No diaphoresis.  Neurologic: Alert oriented to time place and person. Normal strength and no tremor.        LABS:                        13.0   9.03  )-----------( 340      ( 30 Sep 2021 12:54 )             39.3     09-30    138  |  103  |  19.4  ----------------------------<  89  5.0   |  22.0  |  0.77    Ca    9.2      30 Sep 2021 12:54    TPro  7.1  /  Alb  3.8  /  TBili  0.4  /  DBili  x   /  AST  45<H>  /  ALT  16  /  AlkPhos  98  09-30    CARDIAC MARKERS ( 01 Oct 2021 01:17 )  x     / <0.01 ng/mL / x     / x     / x      CARDIAC MARKERS ( 30 Sep 2021 17:53 )  x     / <0.01 ng/mL / x     / x     / x      CARDIAC MARKERS ( 30 Sep 2021 12:54 )  x     / <0.01 ng/mL / x     / x     / x        RADIOLOGY & ADDITIONAL STUDIES: (reviewed)  CXR was independently visualized/reviewed  and demonstrated: clear lungs    CARDIOLOGY TESTING:(reviewed)     12 lead EKG independently visualized/reviewed  and demonstrated atrial paced    ECHOCARDIOGRAM independently visualized/reviewed and demonstrated : Normal LV function and no significant valvular disease    TELEMETRY independently visualized/reviewed and demonstrated : paced rhythm    MEDICATIONS:(reviewed)  MEDICATIONS  (STANDING):  flecainide 100 milliGRAM(s) Oral two times a day  lactated ringers. 1000 milliLiter(s) (125 mL/Hr) IV Continuous <Continuous>  metoprolol succinate ER 50 milliGRAM(s) Oral daily  rivaroxaban 20 milliGRAM(s) Oral with dinner      ASSESSMENT AND PLAN:    : 80/F presenting today with complaints of diaphoresis, lightheadedness and fatigue. She was getting ready to go to the dentist was standing up and felt lightheaded and diaphoretic pt sat down and symptoms improved pt denies any falls or trauma. Pt took amoxicillin prior to going to the dentist for a cleaning at 10am but states has taken this medication before without any side effects. Pt with a history of A-fib which she takes Metoprolol and Xarelto daily, CVA in 2013 with residual left sided weakness, [employer] pacemaker placed in 2009 and a hip replacement in 2015. Pt was in the ER last week for epistaxis which she had cauterized with resolution of bleeding.     Continue Xarelto for AF and prior CVA  Echo without abnormalities  No evidence of ACS at this time  Decrease metoprolol to 25 mg daily and flecainide to 50 mg BID on d/c    No further inpt cardiac work-up needed. Pls recall if any qns/concerns. Will arrange outpt FU post discharge.

## 2021-10-01 NOTE — ED ADULT NURSE REASSESSMENT NOTE - GENERAL PATIENT STATE
comfortable appearance/cooperative
comfortable appearance/cooperative/improvement verbalized/smiling/interactive

## 2021-10-01 NOTE — ED CDU PROVIDER SUBSEQUENT DAY NOTE - ATTENDING CONTRIBUTION TO CARE
Pt had no complaints overnight, CT head performed with no emergent finding, troponin negative x 3, pending TTE read

## 2021-10-01 NOTE — ED CDU PROVIDER DISPOSITION NOTE - PATIENT PORTAL LINK FT
You can access the FollowMyHealth Patient Portal offered by [facility] by registering at the following website: [email or web address]. By joining [facility]’s FollowMyHealth portal, you will also be able to view your health information using other applications (apps) compatible with our system.

## 2021-10-01 NOTE — ED ADULT NURSE REASSESSMENT NOTE - COMFORT CARE
plan of care explained/repositioned
darkened lights/plan of care explained/side rails up/treatment delay explained/wait time explained/warm blanket provided

## 2021-10-01 NOTE — ED CDU PROVIDER DISPOSITION NOTE - CLINICAL COURSE
80 year old female with PMHx A fib, CVA, OA, neuropathy presents to the ED for lightheadedness, and fatigue which started yesterday after taking medications. Denies CP, SOB, syncope. Pt placed in obs for cards consult. Trop x 3 negative, urine neg, CTH with chronic MCA infarct, no acute findings, CXR neg, Echo with nl EF and borderline pulm HTN. Seen and cleared by cards for dc. Cards recommends changing meds :Decrease Metoprolol to 25mg once a day   Decrease Flecainide to 50mg twice a day. Will give cards for f/u and provide return precautions. 80 year old female with PMHx A fib, CVA, OA, neuropathy presents to the ED for lightheadedness, and fatigue which started yesterday after taking medications. Denies CP, SOB, syncope. Currently, all symptoms have resolved. Pt placed in obs for cards consult. Trop x 3 negative, urine neg, CTH with chronic MCA infarct, no acute findings, CXR neg, Echo with nl EF and borderline pulm HTN. Seen and cleared by cards for dc. Cards recommends changing meds :Decrease Metoprolol to 25mg once a day   Decrease Flecainide to 50mg twice a day. Will give cards for f/u and provide return precautions.

## 2023-03-30 ENCOUNTER — OFFICE (OUTPATIENT)
Dept: URBAN - METROPOLITAN AREA CLINIC 113 | Facility: CLINIC | Age: 83
Setting detail: OPHTHALMOLOGY
End: 2023-03-30
Payer: MEDICARE

## 2023-03-30 DIAGNOSIS — H26.493: ICD-10-CM

## 2023-03-30 DIAGNOSIS — H04.123: ICD-10-CM

## 2023-03-30 DIAGNOSIS — H35.379: ICD-10-CM

## 2023-03-30 DIAGNOSIS — H35.30: ICD-10-CM

## 2023-03-30 DIAGNOSIS — H40.003: ICD-10-CM

## 2023-03-30 PROCEDURE — 92020 GONIOSCOPY: CPT | Performed by: OPHTHALMOLOGY

## 2023-03-30 PROCEDURE — 92250 FUNDUS PHOTOGRAPHY W/I&R: CPT | Performed by: OPHTHALMOLOGY

## 2023-03-30 PROCEDURE — 92014 COMPRE OPH EXAM EST PT 1/>: CPT | Performed by: OPHTHALMOLOGY

## 2023-03-30 ASSESSMENT — REFRACTION_CURRENTRX
OD_ADD: +2.50
OS_SPHERE: +0.25
OD_OVR_VA: 20/
OS_SPHERE: +0.25
OS_OVR_VA: 20/
OD_SPHERE: PLANO
OD_CYLINDER: -0.25
OS_ADD: +2.50
OD_OVR_VA: 20/
OS_VPRISM_DIRECTION: PROGS
OS_CYLINDER: -0.25
OS_AXIS: 092
OS_VPRISM_DIRECTION: PROGS
OD_AXIS: 005
OS_OVR_VA: 20/
OD_SPHERE: PLANO
OD_VPRISM_DIRECTION: PROGS
OD_CYLINDER: -0.25
OS_AXIS: 100
OS_CYLINDER: -0.50
OD_ADD: +2.50
OD_VPRISM_DIRECTION: PROGS
OD_AXIS: 173
OS_ADD: +2.50

## 2023-03-30 ASSESSMENT — KERATOMETRY
OD_K2POWER_DIOPTERS: 45.50
OS_K1POWER_DIOPTERS: 44.50
OD_AXISANGLE_DEGREES: 073
OS_AXISANGLE_DEGREES: 130
OS_K2POWER_DIOPTERS: 44.75
METHOD_AUTO_MANUAL: AUTO
OD_K1POWER_DIOPTERS: 45.00

## 2023-03-30 ASSESSMENT — SPHEQUIV_DERIVED
OS_SPHEQUIV: -0.375
OD_SPHEQUIV: -0.75
OS_SPHEQUIV: -0.625
OS_SPHEQUIV: -0.375
OD_SPHEQUIV: -0.375
OD_SPHEQUIV: -0.375

## 2023-03-30 ASSESSMENT — REFRACTION_MANIFEST
OS_ADD: +2.75
OS_AXIS: 060
OS_SPHERE: -0.25
OD_ADD: +2.75
OU_VA: 20/20
OS_CYLINDER: -0.25
OD_ADD: +2.75
OD_AXIS: 155
OS_ADD: +2.50
OS_VA1: 20/20
OD_CYLINDER: -0.25
OD_CYLINDER: SPH
OD_SPHERE: -0.25
OD_CYLINDER: -0.25
OS_AXIS: 080
OD_VA1: 20/20
OS_VA1: 20/20
OS_ADD: +2.75
OS_CYLINDER: -0.25
OD_AXIS: 155
OD_SPHERE: -0.25
OD_VA1: 20/20-
OS_CYLINDER: -0.50
OS_SPHERE: -0.25
OD_SPHERE: -0.25
OD_VA1: 20/20
OS_SPHERE: PL
OS_VA1: 20/20
OS_AXIS: 060
OD_ADD: +2.50

## 2023-03-30 ASSESSMENT — AXIALLENGTH_DERIVED
OS_AL: 23.3278
OD_AL: 23.1055
OD_AL: 23.1055
OS_AL: 23.3278
OD_AL: 23.2465
OS_AL: 23.4234

## 2023-03-30 ASSESSMENT — REFRACTION_AUTOREFRACTION
OD_SPHERE: -0.50
OS_CYLINDER: -0.75
OD_AXIS: 139
OD_CYLINDER: -0.50
OS_AXIS: 061
OS_SPHERE: -0.25

## 2023-03-30 ASSESSMENT — TEAR BREAK UP TIME (TBUT)
OD_TBUT: 1+
OS_TBUT: 1+

## 2023-03-30 ASSESSMENT — LID EXAM ASSESSMENTS
OD_BLEPHARITIS: +1
OS_BLEPHARITIS: +1

## 2023-03-30 ASSESSMENT — VISUAL ACUITY
OD_BCVA: 20/20
OS_BCVA: 20/20

## 2023-03-30 ASSESSMENT — TONOMETRY
OS_IOP_MMHG: 12
OD_IOP_MMHG: 12

## 2023-03-30 ASSESSMENT — CONFRONTATIONAL VISUAL FIELD TEST (CVF)
OS_FINDINGS: FULL
OD_FINDINGS: FULL

## 2023-03-30 ASSESSMENT — SUPERFICIAL PUNCTATE KERATITIS (SPK)
OS_SPK: 1+ 2+
OD_SPK: 1+

## 2023-12-14 ENCOUNTER — OFFICE (OUTPATIENT)
Dept: URBAN - METROPOLITAN AREA CLINIC 113 | Facility: CLINIC | Age: 83
Setting detail: OPHTHALMOLOGY
End: 2023-12-14
Payer: MEDICARE

## 2023-12-14 DIAGNOSIS — H35.379: ICD-10-CM

## 2023-12-14 DIAGNOSIS — H26.493: ICD-10-CM

## 2023-12-14 DIAGNOSIS — H40.003: ICD-10-CM

## 2023-12-14 DIAGNOSIS — H04.123: ICD-10-CM

## 2023-12-14 DIAGNOSIS — H35.30: ICD-10-CM

## 2023-12-14 PROCEDURE — 92083 EXTENDED VISUAL FIELD XM: CPT | Performed by: OPHTHALMOLOGY

## 2023-12-14 PROCEDURE — 92133 CPTRZD OPH DX IMG PST SGM ON: CPT | Performed by: OPHTHALMOLOGY

## 2023-12-14 PROCEDURE — 92014 COMPRE OPH EXAM EST PT 1/>: CPT | Performed by: OPHTHALMOLOGY

## 2023-12-14 ASSESSMENT — REFRACTION_AUTOREFRACTION
OS_SPHERE: -0.25
OD_AXIS: 178
OS_CYLINDER: -0.50
OS_AXIS: 063
OD_CYLINDER: -0.25
OD_SPHERE: -1.00

## 2023-12-14 ASSESSMENT — REFRACTION_CURRENTRX
OS_OVR_VA: 20/
OD_ADD: +2.50
OS_CYLINDER: -0.25
OD_AXIS: 005
OS_CYLINDER: -0.50
OS_OVR_VA: 20/
OS_SPHERE: +0.25
OD_CYLINDER: -0.25
OD_ADD: +2.50
OS_SPHERE: +0.25
OS_ADD: +2.50
OD_VPRISM_DIRECTION: PROGS
OD_OVR_VA: 20/
OD_VPRISM_DIRECTION: PROGS
OD_OVR_VA: 20/
OD_SPHERE: PLANO
OS_AXIS: 092
OD_AXIS: 173
OS_VPRISM_DIRECTION: PROGS
OS_AXIS: 100
OD_CYLINDER: -0.25
OS_ADD: +2.50
OS_VPRISM_DIRECTION: PROGS
OD_SPHERE: PLANO

## 2023-12-14 ASSESSMENT — REFRACTION_MANIFEST
OD_CYLINDER: -0.25
OU_VA: 20/20
OS_SPHERE: -0.25
OS_AXIS: 060
OD_SPHERE: -0.25
OD_SPHERE: -0.25
OS_CYLINDER: -0.25
OD_VA1: 20/20
OS_CYLINDER: -0.50
OD_ADD: +2.75
OS_SPHERE: -0.25
OD_AXIS: 155
OS_ADD: +2.75
OS_CYLINDER: -0.25
OS_AXIS: 080
OD_CYLINDER: -0.25
OD_CYLINDER: SPH
OD_SPHERE: -0.25
OS_SPHERE: PL
OS_VA1: 20/20
OD_ADD: +2.50
OS_VA1: 20/20
OS_ADD: +2.75
OD_VA1: 20/20
OD_VA1: 20/20-
OD_AXIS: 155
OD_ADD: +2.75
OS_AXIS: 060
OS_VA1: 20/20
OS_ADD: +2.50

## 2023-12-14 ASSESSMENT — CONFRONTATIONAL VISUAL FIELD TEST (CVF)
OS_FINDINGS: FULL
OD_FINDINGS: FULL

## 2023-12-14 ASSESSMENT — SUPERFICIAL PUNCTATE KERATITIS (SPK)
OS_SPK: 1+ 2+
OD_SPK: 1+

## 2023-12-14 ASSESSMENT — SPHEQUIV_DERIVED
OS_SPHEQUIV: -0.5
OD_SPHEQUIV: -1.125
OS_SPHEQUIV: -0.375
OD_SPHEQUIV: -0.375
OS_SPHEQUIV: -0.375
OD_SPHEQUIV: -0.375

## 2023-12-14 ASSESSMENT — TEAR BREAK UP TIME (TBUT)
OD_TBUT: 1+
OS_TBUT: 1+

## 2023-12-14 ASSESSMENT — LID EXAM ASSESSMENTS
OS_BLEPHARITIS: +1
OD_BLEPHARITIS: +1

## 2024-01-04 ENCOUNTER — OFFICE (OUTPATIENT)
Dept: URBAN - METROPOLITAN AREA CLINIC 105 | Facility: CLINIC | Age: 84
Setting detail: OPHTHALMOLOGY
End: 2024-01-04
Payer: MEDICARE

## 2024-01-04 DIAGNOSIS — H40.003: ICD-10-CM

## 2024-01-04 DIAGNOSIS — H04.123: ICD-10-CM

## 2024-01-04 DIAGNOSIS — H26.493: ICD-10-CM

## 2024-01-04 DIAGNOSIS — H35.379: ICD-10-CM

## 2024-01-04 PROCEDURE — 99213 OFFICE O/P EST LOW 20 MIN: CPT | Mod: 25 | Performed by: REGISTERED NURSE

## 2024-01-04 PROCEDURE — 68761 CLOSE TEAR DUCT OPENING: CPT | Mod: 50 | Performed by: REGISTERED NURSE

## 2024-01-04 ASSESSMENT — REFRACTION_AUTOREFRACTION
OS_CYLINDER: 0.00
OS_SPHERE: -0.75
OD_AXIS: 142
OS_AXIS: 0.0
OD_CYLINDER: -0.50
OD_SPHERE: -0.75

## 2024-01-04 ASSESSMENT — REFRACTION_MANIFEST
OD_ADD: +2.75
OS_ADD: +2.75
OS_SPHERE: PL
OU_VA: 20/20
OS_ADD: +2.75
OD_SPHERE: -0.25
OS_SPHERE: -0.25
OD_AXIS: 155
OS_AXIS: 060
OD_ADD: +2.50
OD_VA1: 20/20-
OD_CYLINDER: SPH
OS_SPHERE: -0.25
OS_VA1: 20/20
OD_SPHERE: -0.25
OS_VA1: 20/20
OS_VA1: 20/20
OS_CYLINDER: -0.25
OS_AXIS: 080
OS_CYLINDER: -0.25
OD_VA1: 20/20
OD_AXIS: 155
OS_ADD: +2.50
OS_AXIS: 060
OD_ADD: +2.75
OD_CYLINDER: -0.25
OS_CYLINDER: -0.50
OD_SPHERE: -0.25
OD_CYLINDER: -0.25
OD_VA1: 20/20

## 2024-01-04 ASSESSMENT — REFRACTION_CURRENTRX
OS_ADD: +2.50
OS_VPRISM_DIRECTION: PROGS
OD_SPHERE: PLANO
OS_OVR_VA: 20/
OD_AXIS: 173
OS_VPRISM_DIRECTION: PROGS
OD_ADD: +2.50
OS_AXIS: 092
OD_VPRISM_DIRECTION: PROGS
OS_SPHERE: +0.25
OD_ADD: +2.50
OS_OVR_VA: 20/
OS_CYLINDER: -0.25
OD_OVR_VA: 20/
OD_CYLINDER: -0.25
OD_SPHERE: PLANO
OD_OVR_VA: 20/
OS_AXIS: 100
OS_ADD: +2.50
OS_CYLINDER: -0.50
OD_VPRISM_DIRECTION: PROGS
OD_CYLINDER: -0.25
OD_AXIS: 005
OS_SPHERE: +0.25

## 2024-01-04 ASSESSMENT — SPHEQUIV_DERIVED
OS_SPHEQUIV: -0.375
OD_SPHEQUIV: -0.375
OS_SPHEQUIV: -0.375
OD_SPHEQUIV: -0.375
OD_SPHEQUIV: -1
OS_SPHEQUIV: -0.75

## 2024-01-04 ASSESSMENT — CONFRONTATIONAL VISUAL FIELD TEST (CVF)
OS_FINDINGS: FULL
OD_FINDINGS: FULL

## 2024-01-04 ASSESSMENT — LID EXAM ASSESSMENTS
OS_BLEPHARITIS: +1
OD_BLEPHARITIS: +1

## 2024-01-04 ASSESSMENT — SUPERFICIAL PUNCTATE KERATITIS (SPK)
OS_SPK: 1+ 2+
OD_SPK: 1+

## 2024-01-04 ASSESSMENT — TEAR BREAK UP TIME (TBUT)
OS_TBUT: 1+
OD_TBUT: 1+

## 2024-04-11 ENCOUNTER — OFFICE (OUTPATIENT)
Dept: URBAN - METROPOLITAN AREA CLINIC 105 | Facility: CLINIC | Age: 84
Setting detail: OPHTHALMOLOGY
End: 2024-04-11
Payer: MEDICARE

## 2024-04-11 DIAGNOSIS — H04.123: ICD-10-CM

## 2024-04-11 DIAGNOSIS — H35.30: ICD-10-CM

## 2024-04-11 DIAGNOSIS — H26.493: ICD-10-CM

## 2024-04-11 PROCEDURE — 99213 OFFICE O/P EST LOW 20 MIN: CPT | Performed by: REGISTERED NURSE

## 2024-04-11 ASSESSMENT — LID EXAM ASSESSMENTS
OS_BLEPHARITIS: +1
OD_BLEPHARITIS: +1

## 2024-04-24 NOTE — ED ADULT NURSE NOTE - CHIEF COMPLAINT QUOTE
Lois Lamas is requesting a refill on the following medication(s):  Requested Prescriptions     Pending Prescriptions Disp Refills    FLUoxetine (PROZAC) 20 MG capsule [Pharmacy Med Name: FLUOXETINE HCL CAPS 20MG] 90 capsule 3     Sig: TAKE 1 CAPSULE DAILY       Last Visit Date (If Applicable):  10/26/2023    Next Visit Date:    4/29/2024   davy been feeling weak and tired all day, I was taking a shower when I began to feel extremely sweaty and dizzy. presents to ED A&Ox4 GCS 15

## 2025-08-22 ENCOUNTER — EMERGENCY (EMERGENCY)
Facility: HOSPITAL | Age: 85
LOS: 1 days | End: 2025-08-22
Attending: STUDENT IN AN ORGANIZED HEALTH CARE EDUCATION/TRAINING PROGRAM
Payer: MEDICARE

## 2025-08-22 VITALS
HEART RATE: 104 BPM | RESPIRATION RATE: 20 BRPM | DIASTOLIC BLOOD PRESSURE: 123 MMHG | OXYGEN SATURATION: 95 % | SYSTOLIC BLOOD PRESSURE: 187 MMHG | TEMPERATURE: 98 F

## 2025-08-22 DIAGNOSIS — Z98.890 OTHER SPECIFIED POSTPROCEDURAL STATES: Chronic | ICD-10-CM

## 2025-08-22 DIAGNOSIS — Z90.710 ACQUIRED ABSENCE OF BOTH CERVIX AND UTERUS: Chronic | ICD-10-CM

## 2025-08-22 DIAGNOSIS — Z86.79 PERSONAL HISTORY OF OTHER DISEASES OF THE CIRCULATORY SYSTEM: Chronic | ICD-10-CM

## 2025-08-22 PROCEDURE — 99283 EMERGENCY DEPT VISIT LOW MDM: CPT

## 2025-08-22 PROCEDURE — 99284 EMERGENCY DEPT VISIT MOD MDM: CPT | Mod: FS

## 2025-08-22 RX ORDER — TRANEXAMIC ACID 1000 MG/10
20 AMPUL (ML) INTRAVENOUS ONCE
Refills: 0 | Status: ACTIVE | OUTPATIENT
Start: 2025-08-22 | End: 2025-08-22

## 2025-08-22 RX ADMIN — Medication 1 SPRAY(S): at 02:11
